# Patient Record
Sex: MALE | URBAN - METROPOLITAN AREA
[De-identification: names, ages, dates, MRNs, and addresses within clinical notes are randomized per-mention and may not be internally consistent; named-entity substitution may affect disease eponyms.]

---

## 2021-10-14 ENCOUNTER — APPOINTMENT (RX ONLY)
Dept: URBAN - METROPOLITAN AREA CLINIC 43 | Facility: CLINIC | Age: 36
Setting detail: DERMATOLOGY
End: 2021-10-14

## 2021-10-14 DIAGNOSIS — B36.0 PITYRIASIS VERSICOLOR: ICD-10-CM

## 2021-10-14 PROCEDURE — ? ADDITIONAL NOTES

## 2021-10-14 PROCEDURE — ? PRESCRIPTION

## 2021-10-14 PROCEDURE — 99203 OFFICE O/P NEW LOW 30 MIN: CPT

## 2021-10-14 PROCEDURE — ? TREATMENT REGIMEN

## 2021-10-14 PROCEDURE — ? COUNSELING

## 2021-10-14 RX ORDER — FLUCONAZOLE 150 MG/1
TABLET ORAL
Qty: 4 | Refills: 0 | Status: ERX | COMMUNITY
Start: 2021-10-14

## 2021-10-14 RX ORDER — KETOCONAZOLE 20 MG/ML
SHAMPOO, SUSPENSION TOPICAL QD
Qty: 120 | Refills: 4 | Status: ERX | COMMUNITY
Start: 2021-10-14

## 2021-10-14 RX ADMIN — KETOCONAZOLE: 20 SHAMPOO, SUSPENSION TOPICAL at 00:00

## 2021-10-14 RX ADMIN — FLUCONAZOLE: 150 TABLET ORAL at 00:00

## 2021-10-14 NOTE — PROCEDURE: ADDITIONAL NOTES
Additional Notes: Pt was previously prescribed fluconazole 5 years ago by another doctor, successfully cleared up\\nRecommend using ketoconazole for body wash in addition to fluconazole tablet
Detail Level: Simple
Render Risk Assessment In Note?: no

## 2021-10-14 NOTE — HPI: RASH
How Severe Is Your Rash?: mild
Is This A New Presentation, Or A Follow-Up?: Rash
Additional History: Hx of fungal rash 5 years ago. Used pill as fungal cream

## 2021-10-14 NOTE — PROCEDURE: TREATMENT REGIMEN
Initiate Treatment: Fluconazole two tablets today, two tablets 2 weeks from now \\nKetoconazole shampoo to wash body\\n\\nCan use OTC shampoo containing Ketoconazole for maintenance
Detail Level: Zone